# Patient Record
Sex: MALE | Race: WHITE | NOT HISPANIC OR LATINO | Employment: OTHER | RURAL
[De-identification: names, ages, dates, MRNs, and addresses within clinical notes are randomized per-mention and may not be internally consistent; named-entity substitution may affect disease eponyms.]

---

## 2023-04-05 ENCOUNTER — TELEPHONE (OUTPATIENT)
Dept: ORTHOPEDICS | Facility: CLINIC | Age: 73
End: 2023-04-05
Payer: MEDICARE

## 2023-04-05 NOTE — TELEPHONE ENCOUNTER
I spoke to the patient and his wife to schedule an appointment with Dr. Hardy (4/18/23 at 2:30). Patient wife states that if he needs an MRI he will need an open scan and will have to have a technician to turn his pacemaker off. She said his cardiologist is Dr. Bullock.     ----- Message from Jessika Watters sent at 4/4/2023 11:24 AM CDT -----  Regarding: APPOINTMENT  PATIENT IS FORMER PATIENT OF DR LAI, HE FELL ON December 23 AND INJURED HIS SHOULDER. HE WAS SEEN AT [facility]  BY DR TYE WOODRUFF WHICH IS 30 MINUTES AWAY FROM THEM. HIS SHOULDER WAS DISLOCATED AND THEY WERE ABLE TO PUT IT BACK IN PLACE. HE IS STILL HAVING PROBLEMS WITH THAT SHOULDER AND DR DOMINGO WAS RECOMMENDED. HE WANTS TO KNOW IF HE CAN SCHEDULE AN APPOINTMENT WITH DR DOMINGO. CALL BACK NUMBER IS [phone].

## 2023-04-17 DIAGNOSIS — M25.511 RIGHT SHOULDER PAIN, UNSPECIFIED CHRONICITY: Primary | ICD-10-CM

## 2023-04-18 ENCOUNTER — OFFICE VISIT (OUTPATIENT)
Dept: ORTHOPEDICS | Facility: CLINIC | Age: 73
End: 2023-04-18
Payer: MEDICARE

## 2023-04-18 ENCOUNTER — HOSPITAL ENCOUNTER (OUTPATIENT)
Dept: RADIOLOGY | Facility: HOSPITAL | Age: 73
Discharge: HOME | End: 2023-04-18
Attending: ORTHOPAEDIC SURGERY
Payer: MEDICARE

## 2023-04-18 DIAGNOSIS — M25.561 RIGHT KNEE PAIN, UNSPECIFIED CHRONICITY: Primary | ICD-10-CM

## 2023-04-18 DIAGNOSIS — M25.511 RIGHT SHOULDER PAIN, UNSPECIFIED CHRONICITY: ICD-10-CM

## 2023-04-18 DIAGNOSIS — M25.561 RIGHT KNEE PAIN, UNSPECIFIED CHRONICITY: ICD-10-CM

## 2023-04-18 DIAGNOSIS — M75.100 TEAR OF ROTATOR CUFF, UNSPECIFIED LATERALITY, UNSPECIFIED TEAR EXTENT, UNSPECIFIED WHETHER TRAUMATIC: ICD-10-CM

## 2023-04-18 PROCEDURE — 99204 PR OFFICE/OUTPT VISIT, NEW, LEVL IV, 45-59 MIN: ICD-10-PCS | Mod: S$PBB,,, | Performed by: ORTHOPAEDIC SURGERY

## 2023-04-18 PROCEDURE — 73030 X-RAY EXAM OF SHOULDER: CPT | Mod: 26,RT,, | Performed by: ORTHOPAEDIC SURGERY

## 2023-04-18 PROCEDURE — 99212 OFFICE O/P EST SF 10 MIN: CPT | Mod: PBBFAC | Performed by: ORTHOPAEDIC SURGERY

## 2023-04-18 PROCEDURE — 99204 OFFICE O/P NEW MOD 45 MIN: CPT | Mod: S$PBB,,, | Performed by: ORTHOPAEDIC SURGERY

## 2023-04-18 PROCEDURE — 73030 X-RAY EXAM OF SHOULDER: CPT | Mod: TC,RT

## 2023-04-18 PROCEDURE — 73564 X-RAY EXAM KNEE 4 OR MORE: CPT | Mod: 26,RT,, | Performed by: ORTHOPAEDIC SURGERY

## 2023-04-18 PROCEDURE — 73564 XR KNEE COMP 4 OR MORE VIEWS RIGHT: ICD-10-PCS | Mod: 26,RT,, | Performed by: ORTHOPAEDIC SURGERY

## 2023-04-18 PROCEDURE — 73030 XR SHOULDER COMPLETE 2 OR MORE VIEWS RIGHT: ICD-10-PCS | Mod: 26,RT,, | Performed by: ORTHOPAEDIC SURGERY

## 2023-04-18 PROCEDURE — 73564 X-RAY EXAM KNEE 4 OR MORE: CPT | Mod: TC,RT

## 2023-04-18 RX ORDER — AMIODARONE HYDROCHLORIDE 200 MG/1
200 TABLET ORAL
COMMUNITY
Start: 2023-01-19

## 2023-04-18 RX ORDER — OLMESARTAN MEDOXOMIL 20 MG/1
20 TABLET ORAL
COMMUNITY
Start: 2023-01-19

## 2023-04-18 RX ORDER — TERAZOSIN 5 MG/1
5 CAPSULE ORAL
COMMUNITY
Start: 2023-01-19

## 2023-04-18 RX ORDER — HYDRALAZINE HYDROCHLORIDE 100 MG/1
100 TABLET, FILM COATED ORAL 2 TIMES DAILY
COMMUNITY
Start: 2023-01-19

## 2023-04-18 NOTE — PROGRESS NOTES
ASSESSMENT:      ICD-10-CM ICD-9-CM   1. Right knee pain, unspecified chronicity  M25.561 719.46   2. Tear of rotator cuff, unspecified laterality, unspecified tear extent, unspecified whether traumatic  M75.100 840.4       PLAN:     Findings and treatment options were discussed with the patient regarding the diagnosis.   All questions were answered regarding Jose De Jesus Gray 's painful knee.    I suspect he has a large and retracted rotator cuff tear.  He does weigh over 400 lb.  He is going to be very difficult to get an MRI.  Perhaps the best thing might be a CT arthrogram to better assess for the presence of large retracted rotator cuff tear.  This may ultimately be the only thing we can do.  We will see if we can get this ordered for him and see him back once complete  Natural history and expected course discussed. Questions answered. Educational materials distributed. MRI.    There are no Patient Instructions on file for this visit.    IMAGING:   X-ray Shoulder 2 or More Views Right    Result Date: 4/18/2023  See Procedure Notes for results. IMPRESSION: Please see Ortho procedure notes for report.  This procedure was auto-finalized by: Virtual Radiologist  Radiographs his right shoulder to include three views demonstrate bony irregularity seen on the greater tuberosity and posterior humeral head which may be indicative of Hill-Sachs lesion.  There is bony irregularity seen in the glenohumeral joint which may be related to degenerative change.  X-Ray Knee Complete 4 Or More Views Right    Result Date: 4/18/2023  See Procedure Notes for results. IMPRESSION: Please see Ortho procedure notes for report.  This procedure was auto-finalized by: Virtual Radiologist     Right knee radiographs include four views demonstrate tricompartmental osteoarthritis with a varus alignment seen    CC: Knee pain    72 y.o. Male who presents as a new patient to me for evaluation of  right knee pain in a right shoulder  injury.  Occupation: insurance   Pain has been present since December.  Injury description patient had a fall 12/23/2022. He was seen in the ED at [facility] immediately after the fall.    Mechanical symptoms, such as clicking, locking, and popping are present.    Swelling and effusions  are present.   The patient does  describe symptoms of knee instability, such as the knee buckling and the knee giving way.   Symptoms are worsened with activity.  Better with rest. Treatment thus far has included rest, activity modifications, and oral medications.    he  has not had formal physical therapy.  he has not had prior injections injections into the knee recently.   he has not had previous advanced imaging such as MRI.     Patient has not had an MRI on his shoulder. No injections in his shoulder.   Patient has had physical therapy for his shoulder after the injury.   Patient has limited, painful ROM.     Here today to discuss diagnosis and treatment options.          REVIEW OF SYSTEMS:   Constitution: Negative. Negative for chills, fever and night sweats.    Hematologic/Lymphatic: Negative for bleeding problem. Does not bruise/bleed easily.   Skin: Negative for dry skin, itching and rash.   Musculoskeletal: Negative for falls. Positive for knee pain and muscle weakness.     All other review of symptoms were reviewed and found to be noncontributory.     PAST MEDICAL HISTORY:   No past medical history on file.    PAST SURGICAL HISTORY:   No past surgical history on file.    FAMILY HISTORY:   No family history on file.    SOCIAL HISTORY:   Social History     Socioeconomic History    Marital status:        MEDICATIONS:     Current Outpatient Medications:     amiodarone (PACERONE) 200 MG Tab, Take 200 mg by mouth., Disp: , Rfl:     apixaban (ELIQUIS) 5 mg Tab, Take 5 mg by mouth 2 (two) times daily., Disp: , Rfl:     hydrALAZINE (APRESOLINE) 100 MG tablet, Take 100 mg by mouth 2 (two) times  daily., Disp: , Rfl:     olmesartan (BENICAR) 20 MG tablet, Take 20 mg by mouth., Disp: , Rfl:     terazosin (HYTRIN) 5 MG capsule, Take 5 mg by mouth., Disp: , Rfl:     ALLERGIES:   Review of patient's allergies indicates:  Not on File     PHYSICAL EXAMINATION:    General    Nursing note and vitals reviewed.  Constitutional: He is oriented to person, place, and time. He appears well-developed and well-nourished. No distress.   HENT:   Head: Normocephalic and atraumatic.   Nose: Nose normal.   Eyes: Pupils are equal, round, and reactive to light.   Neck: Neck supple.   Cardiovascular:  Normal rate, regular rhythm and intact distal pulses.            Pulmonary/Chest: Effort normal. No respiratory distress. He exhibits no tenderness.   Abdominal: Soft. He exhibits no distension. There is no abdominal tenderness.   Neurological: He is alert and oriented to person, place, and time. He has normal reflexes. He displays normal reflexes. No cranial nerve deficit. He exhibits normal muscle tone.   Psychiatric: He has a normal mood and affect. His behavior is normal. Judgment and thought content normal.     General Musculoskeletal Exam   Gait: antalgic       Right Knee Exam     Inspection   Swelling: present  Effusion: present  Deformity: present    Tenderness   The patient is tender to palpation of the medial joint line.    Crepitus   The patient has crepitus of the patella and medial joint line.    Range of Motion   Extension:  abnormal   Flexion:  abnormal     Tests   Meniscus   Kayleigh:  Medial - positive   Ligament Examination   Lachman: normal (-1 to 2mm)   PCL-Posterior Drawer: normal (0 to 2mm)     MCL - Valgus: normal (0 to 2mm)  LCL - Varus: normal  Pivot Shift: normal (Equal)  Reverse Pivot Shift: normal (Equal)  Dial Test at 30 degrees: normal (< 5 degrees)  Dial Test at 90 degrees: normal (< 5 degrees)  Posterior Sag Test: negative  Posterolateral Corner: unstable (>15 degrees difference)  Patella   Patellar  Tracking: normal  Q-Angle at 90 degrees: normal  Patellar Grind: positive    Other   Sensation: normalRight Shoulder Exam     Inspection/Observation   Swelling: present  Scapular Dyskinesia: positive    Tenderness   The patient is tender to palpation of the acromioclavicular joint, supraspinatus, greater tuberosity and biceps tendon.    Crepitus   The patient has crepitus of the AC joint and greater tuberosity.    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       Muscle Strength   Right Upper Extremity   Supraspinatus: 3/5   Subscapularis: 3/5   Biceps: 4/5   Right Lower Extremity   Quadriceps:  5/5   Hamstrin/5     Reflexes     Right Side   Right Wilson's Sign:  absent  Quadriceps:  2+    Vascular Exam     Right Pulses  Dorsalis Pedis:      2+  Posterior Tibial:      2+  Radial:                    2+        Orders Placed This Encounter   Procedures    X-Ray Knee Complete 4 Or More Views Right     Standing Status:   Future     Number of Occurrences:   1     Standing Expiration Date:   2024     Order Specific Question:   May the Radiologist modify the order per protocol to meet the clinical needs of the patient?     Answer:   Yes     Order Specific Question:   Release to patient     Answer:   Immediate       Procedures

## 2023-04-19 DIAGNOSIS — M25.511 RIGHT SHOULDER PAIN, UNSPECIFIED CHRONICITY: Primary | ICD-10-CM

## 2023-05-03 ENCOUNTER — HOSPITAL ENCOUNTER (OUTPATIENT)
Dept: RADIOLOGY | Facility: HOSPITAL | Age: 73
Discharge: HOME | End: 2023-05-03
Attending: ORTHOPAEDIC SURGERY
Payer: MEDICARE

## 2023-05-03 DIAGNOSIS — M25.511 RIGHT SHOULDER PAIN, UNSPECIFIED CHRONICITY: ICD-10-CM

## 2023-05-03 PROCEDURE — 73200 CT UPPER EXTREMITY W/O DYE: CPT | Mod: TC,RT

## 2023-05-03 PROCEDURE — 73200 CT SHOULDER WITHOUT CONTRAST RIGHT: ICD-10-PCS | Mod: 26,RT,, | Performed by: STUDENT IN AN ORGANIZED HEALTH CARE EDUCATION/TRAINING PROGRAM

## 2023-05-03 PROCEDURE — 73200 CT UPPER EXTREMITY W/O DYE: CPT | Mod: 26,RT,, | Performed by: STUDENT IN AN ORGANIZED HEALTH CARE EDUCATION/TRAINING PROGRAM

## 2023-05-11 ENCOUNTER — TELEPHONE (OUTPATIENT)
Dept: ORTHOPEDICS | Facility: CLINIC | Age: 73
End: 2023-05-11
Payer: MEDICARE

## 2023-05-11 DIAGNOSIS — M25.511 RIGHT SHOULDER PAIN, UNSPECIFIED CHRONICITY: Primary | ICD-10-CM

## 2023-05-11 NOTE — TELEPHONE ENCOUNTER
----- Message from Glendy Serra sent at 5/11/2023 12:50 PM CDT -----  Regarding: Results  Please call patient wife Sade Gray about her  CT cause didn't understand. Please call her @ [phone] or [phone]

## 2023-05-11 NOTE — TELEPHONE ENCOUNTER
----- Message from Glendy Serra sent at 5/5/2023 12:03 PM CDT -----  Regarding: CT scan results  Patient is calling for CT results. Please call patient at [phone]. Thanks!

## 2023-07-06 ENCOUNTER — TELEPHONE (OUTPATIENT)
Dept: ORTHOPEDICS | Facility: CLINIC | Age: 73
End: 2023-07-06
Payer: MEDICARE

## 2023-07-06 NOTE — TELEPHONE ENCOUNTER
FOLLOW-UP APPOINTMENT SCHEDULES 7/18 AT 3:15    ----- Message from Glendy Serra sent at 6/29/2023  3:10 PM CDT -----  Patient wife said that patient have finish his therapy 12% more mobility in arm, she want to know what is his next step, do he need armenta therapy, are do he to come see Dr Ryan. Please call her @ [phone]

## 2023-07-18 ENCOUNTER — OFFICE VISIT (OUTPATIENT)
Dept: ORTHOPEDICS | Facility: CLINIC | Age: 73
End: 2023-07-18
Payer: MEDICARE

## 2023-07-18 DIAGNOSIS — M75.100 TEAR OF ROTATOR CUFF, UNSPECIFIED LATERALITY, UNSPECIFIED TEAR EXTENT, UNSPECIFIED WHETHER TRAUMATIC: Primary | ICD-10-CM

## 2023-07-18 DIAGNOSIS — M25.511 RIGHT SHOULDER PAIN, UNSPECIFIED CHRONICITY: ICD-10-CM

## 2023-07-18 PROCEDURE — 99214 OFFICE O/P EST MOD 30 MIN: CPT | Mod: S$PBB,,, | Performed by: ORTHOPAEDIC SURGERY

## 2023-07-18 PROCEDURE — 99214 PR OFFICE/OUTPT VISIT, EST, LEVL IV, 30-39 MIN: ICD-10-PCS | Mod: S$PBB,,, | Performed by: ORTHOPAEDIC SURGERY

## 2023-07-18 PROCEDURE — 99213 OFFICE O/P EST LOW 20 MIN: CPT | Mod: PBBFAC | Performed by: ORTHOPAEDIC SURGERY

## 2023-07-18 RX ORDER — SULFAMETHOXAZOLE AND TRIMETHOPRIM 800; 160 MG/1; MG/1
1 TABLET ORAL 2 TIMES DAILY
Qty: 14 TABLET | Refills: 0 | Status: SHIPPED | OUTPATIENT
Start: 2023-07-18 | End: 2023-07-25

## 2023-07-18 NOTE — PROGRESS NOTES
Jose De Jesus Gray returns to clinic for a follow up visit regarding     ICD-10-CM ICD-9-CM   1. Tear of rotator cuff, unspecified laterality, unspecified tear extent, unspecified whether traumatic  M75.100 840.4   2. Right shoulder pain, unspecified chronicity  M25.511 719.41       Patient is still complaining of shoulder pain today. He has been through physical therapy and reports his shoulder pain is no better.   Patient had another fall on Saturday and landed on his left side. He states that he is just stove up, but is not hurting as bad as the right.       PAST MEDICAL HISTORY:   No past medical history on file.  PAST SURGICAL HISTORY:   No past surgical history on file.      PHYSICAL EXAMINATION:  General    Nursing note and vitals reviewed.  Constitutional: He is oriented to person, place, and time. He appears well-developed and well-nourished. No distress.   HENT:   Head: Normocephalic and atraumatic.   Neck: Neck supple.   Cardiovascular:  Normal rate, regular rhythm and intact distal pulses.            Pulmonary/Chest: Effort normal. No respiratory distress. He exhibits no tenderness.   Abdominal: Soft. He exhibits no distension. There is no abdominal tenderness.   Neurological: He is alert and oriented to person, place, and time. He has normal reflexes. He displays normal reflexes. No cranial nerve deficit. He exhibits normal muscle tone.   Psychiatric: He has a normal mood and affect. His behavior is normal. Judgment and thought content normal.         Right Shoulder Exam     Inspection/Observation   Swelling: present  Scapular Dyskinesia: positive    Tenderness   The patient is tender to palpation of the acromioclavicular joint, supraspinatus, greater tuberosity and biceps tendon.    Crepitus   The patient has crepitus of the AC joint and greater tuberosity.    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       Muscle Strength   Right Upper Extremity   Supraspinatus: 3/5   Subscapularis: 3/5   Biceps: 4/5     Reflexes     Right Side   Right Wilson's Sign:  absent    Vascular Exam     Right Pulses      Radial:                    2+        IMAGING:  No results found.       ASSESSMENT:      ICD-10-CM ICD-9-CM   1. Tear of rotator cuff, unspecified laterality, unspecified tear extent, unspecified whether traumatic  M75.100 840.4   2. Right shoulder pain, unspecified chronicity  M25.511 719.41       PLAN:     -Findings and treatment options were discussed with the patient  -All questions answered  Natural history and expected course discussed. Questions answered.    He is over 440 lb and I think he would have a very difficult time were right shoulder arthroscopy and rotator cuff repair and certainly would be a candidate for reverse shoulder arthroplasty at this point.  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  He is not really much pain except with overhead activity and motion.  I asked that he refrain from doing these activities.  He could be a candidate after about  lb of weight loss.  He is currently too heavy to accommodate positioning on our tables in the operating room.  He voiced his understanding with the treatment plan is going to consider injections in the future.  He is not really any pain right now so no further injection is needed.    Greater than 30 minutes spent on this  encounter including 10 minutes reviewing imaging and notes, 15 minutes with the patient, 5 minutes documentation      There are no Patient Instructions on file for this visit.    No orders of the defined types were placed in this encounter.      Procedures